# Patient Record
Sex: MALE | URBAN - METROPOLITAN AREA
[De-identification: names, ages, dates, MRNs, and addresses within clinical notes are randomized per-mention and may not be internally consistent; named-entity substitution may affect disease eponyms.]

---

## 2017-03-21 ENCOUNTER — MEDICAL CORRESPONDENCE (OUTPATIENT)
Dept: HEALTH INFORMATION MANAGEMENT | Facility: CLINIC | Age: 72
End: 2017-03-21

## 2017-03-21 ENCOUNTER — TRANSFERRED RECORDS (OUTPATIENT)
Dept: HEALTH INFORMATION MANAGEMENT | Facility: CLINIC | Age: 72
End: 2017-03-21

## 2017-04-26 ENCOUNTER — PRE VISIT (OUTPATIENT)
Dept: OTOLARYNGOLOGY | Facility: CLINIC | Age: 72
End: 2017-04-26

## 2017-04-26 NOTE — TELEPHONE ENCOUNTER
1.  Date/reason for appt: 5/26/17 hearing loss   2.  Referring provider: STACEY GONSALES   3.  Call to patient (Yes / No - short description): no, referral received   4.  Previous care at / records requested from: P, [facility]   5.  Other: Referral received. Will need to send request for records.

## 2017-05-17 ENCOUNTER — OFFICE VISIT (OUTPATIENT)
Dept: AUDIOLOGY | Facility: CLINIC | Age: 72
End: 2017-05-17

## 2017-05-17 DIAGNOSIS — H90.5 SENSORINEURAL HEARING LOSS: Primary | ICD-10-CM

## 2017-05-17 NOTE — MR AVS SNAPSHOT
After Visit Summary   5/17/2017    Kalin Ramos    MRN: [id number]           Patient Information     Date Of Birth          1945        Visit Information        Provider Department      5/17/2017 9:00 AM Roxy Bunn AuD;  NIHARIKA ABR [facility]ACH[facility] 1 M [facility] Audiology        Today's Diagnoses     Sensorineural hearing loss    -  1       Follow-ups after your visit        Who to contact     Please call your clinic at [phone] to:    Ask questions about your health    Make or cancel appointments    Discuss your medicines    Learn about your test results    Speak to your doctor   If you have compliments or concerns about an experience at your clinic, or if you wish to file a complaint, please contact [facility] Patient Relations at [phone] or email us at [email or web address]         Additional Information About Your Visit        MyChart Information     [employer] gives you secure access to your electronic health record. If you see a primary care provider, you can also send messages to your care team and make appointments. If you have questions, please call your primary care clinic.  If you do not have a primary care provider, please call [phone] and they will assist you.      [address] is an electronic gateway that provides easy, online access to your medical records. With [address], you can request a clinic appointment, read your test results, renew a prescription or communicate with your care team.     To access your existing account, please contact your [facility] or call [phone] for assistance.        Care EveryWhere ID     This is your Care EveryWhere ID. This could be used by other organizations to access your [place] medical records  [id number]         Blood Pressure from Last 3 Encounters:   No data found for BP    Weight from Last 3 Encounters:   No data found for Wt              We Performed the  Following     Auditory Evoked Potential - Limited   (79778)     Otoacoustic Emissions - Limited   (85191)     Tympanometry (impedance - testing) (33277)        Primary Care Provider    None Specified       No primary provider on file.        Thank you!     Thank you for choosing [facility] AUDIOLOGY  for your care. Our goal is always to provide you with excellent care. Hearing back from our patients is one way we can continue to improve our services. Please take a few minutes to complete the written survey that you may receive in the mail after your visit with us. Thank you!             Your Updated Medication List - Protect others around you: Learn how to safely use, store and throw away your medicines at www.disposemymeds.org.      Notice  As of 5/17/2017 11:59 PM    You have not been prescribed any medications.

## 2017-05-18 NOTE — PROGRESS NOTES
AUDIOLOGY REPORT    BACKGROUND INFORMATION: Kalin Ramos, 71 year old male,was seen in Audiology at the [facility] [place] on 5/17/2017  for an unsedated neurodiagnostic auditory brainstem response (ABR) evaluation ordered by Dr. Fidencio Osman. Kalin was accompanied today by his wife.  A previous hearing evaluation was completed at [facility] on 4/14/2016 and results revealed mild to severe sensorineural hearing loss in the right ear, and mild to moderately-severe sensorineural hearing loss in the left ear. Results from that day showed poor word recognition of 24% bilaterally. The patient was seen for an auditory brainstem response (ABR) evaluation on 8/17/2016 and results were consistent with behavioral thresholds and ruled out auditory neuropathy. 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 The patient does wear bilateral hearing aids; however he reports little benefit from them. He reports that he recently had his hearing aid adjusted and he notices only a slight improvement in hearing. His wife reports that she has not noticed any changes in his hearing after hearing aid adjustments were made. The patient denies additional ear related symptoms and dizziness.     TEST RESULTS AND PROCEDURES: Tympanograms showed normal eardrum mobility bilaterally. Distortion product otoacoustic emissions (DPOAEs) from 750-8000 Hz were absent bilaterally.     One-channel ABR recording was performed. Click rates of 11.1/sec, 21.1/sec, and 51.1/sec were utilized for today's evaluation. Intensity levels of 90 dB nHL were used.     Absolute wave I, III, and V latencies were within normal limits bilaterally.  Interwave latencies were within normal limits bilaterally.   Interaural wave latencies were within normal limits. Latency shifts of less than 0.6 ms were noted when changing click rates from 11.1/sec to 51.1/seconds, which is within the  normal range. Good morphology was noted for rarefaction and condensation clicks, ruling out auditory neuropathy/dyssynchrony. Of note, wave I and III morphology became poorer with increasing click rates; however this is an expected finding based on the patient's hearing loss.          Wave I Wave III Wave V I-III III-V I-V   Right ear 1.70 ms 3.62 ms 5.70 ms 1.92 ms 2.08 ms 4.00 ms   Left ear 1.70 ms 3.78 ms 5.70 ms 2.08 ms  1.92 ms 4.00ms     Interaural latencies Wave I Wave III Wave V    0.16 ms  0.16 ms 0 ms     Wave V latency shift from rate of 11.1 to 51.1 Right Left    0.42 ms  0.33 ms       * Denotes latencies outside of the normative range.    SUMMARY AND RECOMMENDATIONS: Today's results show a normal neurodiagnostic auditory brainstem response (ABR) evaluation, bilaterally. Absolute latencies, interwave latencies, interaural latencies, and latency shifts were all within the normal range. Good morphology of waveforms rules out auditory neuropathy bilaterally. It is recommended that the patient follow up with Dr. Fidencio Osman for medical management.  Please call this clinic if there are questions regarding these results or recommendations.    Tania Cox.  Licensed Audiologist  [place] #[id number]      CC: Fidencio Osman M.D.

## 2020-03-02 ENCOUNTER — HEALTH MAINTENANCE LETTER (OUTPATIENT)
Age: 75
End: 2020-03-02

## 2020-12-20 ENCOUNTER — HEALTH MAINTENANCE LETTER (OUTPATIENT)
Age: 75
End: 2020-12-20

## 2021-04-18 ENCOUNTER — HEALTH MAINTENANCE LETTER (OUTPATIENT)
Age: 76
End: 2021-04-18

## 2021-10-03 ENCOUNTER — HEALTH MAINTENANCE LETTER (OUTPATIENT)
Age: 76
End: 2021-10-03

## 2022-05-15 ENCOUNTER — HEALTH MAINTENANCE LETTER (OUTPATIENT)
Age: 77
End: 2022-05-15

## 2022-09-04 ENCOUNTER — HEALTH MAINTENANCE LETTER (OUTPATIENT)
Age: 77
End: 2022-09-04

## 2023-06-03 ENCOUNTER — HEALTH MAINTENANCE LETTER (OUTPATIENT)
Age: 78
End: 2023-06-03